# Patient Record
Sex: FEMALE | Race: WHITE | NOT HISPANIC OR LATINO | URBAN - METROPOLITAN AREA
[De-identification: names, ages, dates, MRNs, and addresses within clinical notes are randomized per-mention and may not be internally consistent; named-entity substitution may affect disease eponyms.]

---

## 2020-09-26 ENCOUNTER — INPATIENT (INPATIENT)
Facility: HOSPITAL | Age: 60
LOS: 0 days | Discharge: LEFT BEFORE DISCHARGE | End: 2020-09-26
Attending: STUDENT IN AN ORGANIZED HEALTH CARE EDUCATION/TRAINING PROGRAM | Admitting: STUDENT IN AN ORGANIZED HEALTH CARE EDUCATION/TRAINING PROGRAM
Payer: COMMERCIAL

## 2020-09-26 ENCOUNTER — TRANSCRIPTION ENCOUNTER (OUTPATIENT)
Age: 60
End: 2020-09-26

## 2020-09-26 VITALS
WEIGHT: 190.04 LBS | HEART RATE: 61 BPM | RESPIRATION RATE: 18 BRPM | OXYGEN SATURATION: 97 % | SYSTOLIC BLOOD PRESSURE: 193 MMHG | TEMPERATURE: 98 F | DIASTOLIC BLOOD PRESSURE: 92 MMHG

## 2020-09-26 VITALS
OXYGEN SATURATION: 99 % | SYSTOLIC BLOOD PRESSURE: 146 MMHG | RESPIRATION RATE: 18 BRPM | HEART RATE: 66 BPM | DIASTOLIC BLOOD PRESSURE: 63 MMHG | TEMPERATURE: 99 F

## 2020-09-26 DIAGNOSIS — Z98.890 OTHER SPECIFIED POSTPROCEDURAL STATES: Chronic | ICD-10-CM

## 2020-09-26 LAB
ALBUMIN SERPL ELPH-MCNC: 4.4 G/DL — SIGNIFICANT CHANGE UP (ref 3.5–5.2)
ALP SERPL-CCNC: 63 U/L — SIGNIFICANT CHANGE UP (ref 30–115)
ALT FLD-CCNC: 21 U/L — SIGNIFICANT CHANGE UP (ref 0–41)
ANION GAP SERPL CALC-SCNC: 12 MMOL/L — SIGNIFICANT CHANGE UP (ref 7–14)
APTT BLD: 27.8 SEC — SIGNIFICANT CHANGE UP (ref 27–39.2)
AST SERPL-CCNC: 23 U/L — SIGNIFICANT CHANGE UP (ref 0–41)
BASOPHILS # BLD AUTO: 0.04 K/UL — SIGNIFICANT CHANGE UP (ref 0–0.2)
BASOPHILS NFR BLD AUTO: 0.3 % — SIGNIFICANT CHANGE UP (ref 0–1)
BILIRUB SERPL-MCNC: 0.3 MG/DL — SIGNIFICANT CHANGE UP (ref 0.2–1.2)
BLD GP AB SCN SERPL QL: SIGNIFICANT CHANGE UP
BUN SERPL-MCNC: 19 MG/DL — SIGNIFICANT CHANGE UP (ref 10–20)
CALCIUM SERPL-MCNC: 8 MG/DL — LOW (ref 8.5–10.1)
CHLORIDE SERPL-SCNC: 97 MMOL/L — LOW (ref 98–110)
CO2 SERPL-SCNC: 23 MMOL/L — SIGNIFICANT CHANGE UP (ref 17–32)
CREAT SERPL-MCNC: 0.7 MG/DL — SIGNIFICANT CHANGE UP (ref 0.7–1.5)
EOSINOPHIL # BLD AUTO: 0.02 K/UL — SIGNIFICANT CHANGE UP (ref 0–0.7)
EOSINOPHIL NFR BLD AUTO: 0.2 % — SIGNIFICANT CHANGE UP (ref 0–8)
ETHANOL SERPL-MCNC: <10 MG/DL — SIGNIFICANT CHANGE UP
GLUCOSE SERPL-MCNC: 339 MG/DL — HIGH (ref 70–99)
HCT VFR BLD CALC: 42.4 % — SIGNIFICANT CHANGE UP (ref 37–47)
HGB BLD-MCNC: 14.1 G/DL — SIGNIFICANT CHANGE UP (ref 12–16)
IMM GRANULOCYTES NFR BLD AUTO: 0.5 % — HIGH (ref 0.1–0.3)
INR BLD: 0.95 RATIO — SIGNIFICANT CHANGE UP (ref 0.65–1.3)
LACTATE SERPL-SCNC: 1.4 MMOL/L — SIGNIFICANT CHANGE UP (ref 0.7–2)
LIDOCAIN IGE QN: 26 U/L — SIGNIFICANT CHANGE UP (ref 7–60)
LYMPHOCYTES # BLD AUTO: 1.82 K/UL — SIGNIFICANT CHANGE UP (ref 1.2–3.4)
LYMPHOCYTES # BLD AUTO: 14.5 % — LOW (ref 20.5–51.1)
MCHC RBC-ENTMCNC: 28.2 PG — SIGNIFICANT CHANGE UP (ref 27–31)
MCHC RBC-ENTMCNC: 33.3 G/DL — SIGNIFICANT CHANGE UP (ref 32–37)
MCV RBC AUTO: 84.8 FL — SIGNIFICANT CHANGE UP (ref 81–99)
MONOCYTES # BLD AUTO: 0.36 K/UL — SIGNIFICANT CHANGE UP (ref 0.1–0.6)
MONOCYTES NFR BLD AUTO: 2.9 % — SIGNIFICANT CHANGE UP (ref 1.7–9.3)
NEUTROPHILS # BLD AUTO: 10.22 K/UL — HIGH (ref 1.4–6.5)
NEUTROPHILS NFR BLD AUTO: 81.6 % — HIGH (ref 42.2–75.2)
NRBC # BLD: 0 /100 WBCS — SIGNIFICANT CHANGE UP (ref 0–0)
PLATELET # BLD AUTO: 350 K/UL — SIGNIFICANT CHANGE UP (ref 130–400)
POTASSIUM SERPL-MCNC: 4.5 MMOL/L — SIGNIFICANT CHANGE UP (ref 3.5–5)
POTASSIUM SERPL-SCNC: 4.5 MMOL/L — SIGNIFICANT CHANGE UP (ref 3.5–5)
PROT SERPL-MCNC: 7.4 G/DL — SIGNIFICANT CHANGE UP (ref 6–8)
PROTHROM AB SERPL-ACNC: 10.9 SEC — SIGNIFICANT CHANGE UP (ref 9.95–12.87)
RBC # BLD: 5 M/UL — SIGNIFICANT CHANGE UP (ref 4.2–5.4)
RBC # FLD: 13.2 % — SIGNIFICANT CHANGE UP (ref 11.5–14.5)
SODIUM SERPL-SCNC: 132 MMOL/L — LOW (ref 135–146)
WBC # BLD: 12.52 K/UL — HIGH (ref 4.8–10.8)
WBC # FLD AUTO: 12.52 K/UL — HIGH (ref 4.8–10.8)

## 2020-09-26 PROCEDURE — 71260 CT THORAX DX C+: CPT | Mod: 26

## 2020-09-26 PROCEDURE — 71046 X-RAY EXAM CHEST 2 VIEWS: CPT | Mod: 26

## 2020-09-26 PROCEDURE — 70486 CT MAXILLOFACIAL W/O DYE: CPT | Mod: 26

## 2020-09-26 PROCEDURE — 73090 X-RAY EXAM OF FOREARM: CPT | Mod: 26,RT

## 2020-09-26 PROCEDURE — 73110 X-RAY EXAM OF WRIST: CPT | Mod: 26,50

## 2020-09-26 PROCEDURE — 72125 CT NECK SPINE W/O DYE: CPT | Mod: 26

## 2020-09-26 PROCEDURE — 72170 X-RAY EXAM OF PELVIS: CPT | Mod: 26

## 2020-09-26 PROCEDURE — 99285 EMERGENCY DEPT VISIT HI MDM: CPT | Mod: 25

## 2020-09-26 PROCEDURE — 73130 X-RAY EXAM OF HAND: CPT | Mod: 26,50

## 2020-09-26 PROCEDURE — 74177 CT ABD & PELVIS W/CONTRAST: CPT | Mod: 26

## 2020-09-26 PROCEDURE — 99283 EMERGENCY DEPT VISIT LOW MDM: CPT

## 2020-09-26 PROCEDURE — 70450 CT HEAD/BRAIN W/O DYE: CPT | Mod: 26,77

## 2020-09-26 PROCEDURE — 70450 CT HEAD/BRAIN W/O DYE: CPT | Mod: 26

## 2020-09-26 PROCEDURE — 29125 APPL SHORT ARM SPLINT STATIC: CPT | Mod: 59

## 2020-09-26 PROCEDURE — 11730 AVULSION NAIL PLATE SIMPLE 1: CPT

## 2020-09-26 RX ORDER — DEXTROSE 50 % IN WATER 50 %
25 SYRINGE (ML) INTRAVENOUS ONCE
Refills: 0 | Status: DISCONTINUED | OUTPATIENT
Start: 2020-09-26 | End: 2020-09-26

## 2020-09-26 RX ORDER — ROSUVASTATIN CALCIUM 5 MG/1
1 TABLET ORAL
Qty: 0 | Refills: 0 | DISCHARGE

## 2020-09-26 RX ORDER — SODIUM CHLORIDE 9 MG/ML
1000 INJECTION, SOLUTION INTRAVENOUS
Refills: 0 | Status: DISCONTINUED | OUTPATIENT
Start: 2020-09-26 | End: 2020-09-26

## 2020-09-26 RX ORDER — INSULIN LISPRO 100/ML
VIAL (ML) SUBCUTANEOUS
Refills: 0 | Status: DISCONTINUED | OUTPATIENT
Start: 2020-09-26 | End: 2020-09-26

## 2020-09-26 RX ORDER — LEVOTHYROXINE SODIUM 125 MCG
1 TABLET ORAL
Qty: 0 | Refills: 0 | DISCHARGE

## 2020-09-26 RX ORDER — DEXTROSE 50 % IN WATER 50 %
12.5 SYRINGE (ML) INTRAVENOUS ONCE
Refills: 0 | Status: DISCONTINUED | OUTPATIENT
Start: 2020-09-26 | End: 2020-09-26

## 2020-09-26 RX ORDER — SIMVASTATIN 20 MG/1
10 TABLET, FILM COATED ORAL AT BEDTIME
Refills: 0 | Status: DISCONTINUED | OUTPATIENT
Start: 2020-09-26 | End: 2020-09-26

## 2020-09-26 RX ORDER — MORPHINE SULFATE 50 MG/1
4 CAPSULE, EXTENDED RELEASE ORAL ONCE
Refills: 0 | Status: DISCONTINUED | OUTPATIENT
Start: 2020-09-26 | End: 2020-09-26

## 2020-09-26 RX ORDER — LEVOTHYROXINE SODIUM 125 MCG
200 TABLET ORAL DAILY
Refills: 0 | Status: DISCONTINUED | OUTPATIENT
Start: 2020-09-26 | End: 2020-09-26

## 2020-09-26 RX ORDER — MORPHINE SULFATE 50 MG/1
2 CAPSULE, EXTENDED RELEASE ORAL ONCE
Refills: 0 | Status: DISCONTINUED | OUTPATIENT
Start: 2020-09-26 | End: 2020-09-26

## 2020-09-26 RX ORDER — DEXTROSE 50 % IN WATER 50 %
15 SYRINGE (ML) INTRAVENOUS ONCE
Refills: 0 | Status: DISCONTINUED | OUTPATIENT
Start: 2020-09-26 | End: 2020-09-26

## 2020-09-26 RX ORDER — INSULIN HUMAN 100 [IU]/ML
10 INJECTION, SOLUTION SUBCUTANEOUS
Qty: 0 | Refills: 0 | DISCHARGE

## 2020-09-26 RX ORDER — GLUCAGON INJECTION, SOLUTION 0.5 MG/.1ML
1 INJECTION, SOLUTION SUBCUTANEOUS ONCE
Refills: 0 | Status: DISCONTINUED | OUTPATIENT
Start: 2020-09-26 | End: 2020-09-26

## 2020-09-26 RX ORDER — DAPAGLIFLOZIN 10 MG/1
1 TABLET, FILM COATED ORAL
Qty: 0 | Refills: 0 | DISCHARGE

## 2020-09-26 RX ADMIN — MORPHINE SULFATE 4 MILLIGRAM(S): 50 CAPSULE, EXTENDED RELEASE ORAL at 09:03

## 2020-09-26 RX ADMIN — MORPHINE SULFATE 4 MILLIGRAM(S): 50 CAPSULE, EXTENDED RELEASE ORAL at 16:39

## 2020-09-26 RX ADMIN — Medication 1 TABLET(S): at 07:30

## 2020-09-26 RX ADMIN — MORPHINE SULFATE 4 MILLIGRAM(S): 50 CAPSULE, EXTENDED RELEASE ORAL at 09:15

## 2020-09-26 RX ADMIN — MORPHINE SULFATE 2 MILLIGRAM(S): 50 CAPSULE, EXTENDED RELEASE ORAL at 13:04

## 2020-09-26 NOTE — CONSULT NOTE ADULT - SUBJECTIVE AND OBJECTIVE BOX
HPI:  59 yo female hx of DM present c/o right sided facial injury/ b/l upper arm pain and left 2nd toe toenail avulsion s/p tripped and fell over a step at home about an hour ago. denies numbness/weakness to injured extremities. movement worsen the pain. ice and advil improved her pain. denies LOC. denies neck/back pain. denies headache /dizziness/nausea/vomiting/chest pain/sob/abd pain. last tetanus < 5 years    Pt seen and examined at bedside with Dr Crandall.  Pt sustained fall at home.  Remembers fall, no LOC.  Denies headache, dizziness or vision trouble.  AAOX3.      CT head: < from: CT Head No Cont (09.26.20 @ 06:34) >  IMPRESSION:  Acute, displaced fractures of the right zygomatic process and right maxilla. See separately dictated CT maxillofacial from the same day.  Hemorrhage within the right maxillary sinus.  Small amount of subarachnoid blood in the right sylvian fissure. No evidence of mass effect.  < end of copied text >      < from: CT Maxillofacial No Cont (09.26.20 @ 06:40) >  IMPRESSION:  1. Fractures of the right orbit as described above, involving the right infraorbital foramen, with multiple fractures of the right zygoma/maxilla at the zygomaxillary region, infratemporal surface of the right maxilla and right frontal zygomatic suture diastases. Associated blood in the right maxillary sinus with fracture fragments slightly protruding into the right maxillary sinus.  2. No retrobulbar hematoma.  3. Small focus of hemorrhage in the right medial temporal lobe. Unclear if this is within the subarachnoid space or could represent small parenchymal contusion.  4. Gas in the right infraorbital soft tissues, right temporomandibular joint and right infratemporal fossa, presumably posttraumatic.  5. Some sclerosis and deformityof the right lateral pterygoid plate. Unclear if this is acute or potentially remote trauma.  < end of copied text >            PAST MEDICAL & SURGICAL HISTORY:  Diabetes mellitus    No significant past surgical history        Home Medications:      Allergies    No Known Allergies    Intolerances        ROS:  [ x ] A ten-point review of systems is negative except as noted   [  ] Due to altered mental status/intubation, subjective information were not able to be obtained from the patient. History was obtained, to the extent possible, from review of the chart and collateral sources of information    MEDICATIONS  (STANDING):    MEDICATIONS  (PRN):      ICU Vital Signs Last 24 Hrs  T(C): 36.4 (26 Sep 2020 04:09), Max: 36.4 (26 Sep 2020 04:09)  T(F): 97.5 (26 Sep 2020 04:09), Max: 97.5 (26 Sep 2020 04:09)  HR: 61 (26 Sep 2020 04:09) (61 - 61)  BP: 193/92 (26 Sep 2020 04:09) (193/92 - 193/92)  BP(mean): --  ABP: --  ABP(mean): --  RR: 18 (26 Sep 2020 04:09) (18 - 18)  SpO2: 97% (26 Sep 2020 04:09) (97% - 97%)      I&O's Detail      CBC Full  -  ( 26 Sep 2020 09:04 )  WBC Count : 12.52 K/uL  RBC Count : 5.00 M/uL  Hemoglobin : 14.1 g/dL  Hematocrit : 42.4 %  Platelet Count - Automated : 350 K/uL  Mean Cell Volume : 84.8 fL  Mean Cell Hemoglobin : 28.2 pg  Mean Cell Hemoglobin Concentration : 33.3 g/dL  Auto Neutrophil # : 10.22 K/uL  Auto Lymphocyte # : 1.82 K/uL  Auto Monocyte # : 0.36 K/uL  Auto Eosinophil # : 0.02 K/uL  Auto Basophil # : 0.04 K/uL  Auto Neutrophil % : 81.6 %  Auto Lymphocyte % : 14.5 %  Auto Monocyte % : 2.9 %  Auto Eosinophil % : 0.2 %  Auto Basophil % : 0.3 %    09-26    132<L>  |  97<L>  |  19  ----------------------------<  339<H>  4.5   |  23  |  0.7    Ca    8.0<L>      26 Sep 2020 09:04    TPro  7.4  /  Alb  4.4  /  TBili  0.3  /  DBili  x   /  AST  23  /  ALT  21  /  AlkPhos  63  09-26            AAOX3. Verbal function intact  follows commands  tongue midline  no drift  PERRL  no drift  Pronator Drift: none  Motor: [email or web address]        Assessment/Plan:  No neurosurgical intervention  repeat CT head  if stable, may be discharged Neurosurgery wise  f/u Ortho c/s  f/u OMFS c/s  d/w attg

## 2020-09-26 NOTE — CONSULT NOTE ADULT - ASSESSMENT
Patient is a 59 y/o female with PMHx of DM, HLD, Hypothyroid, and back pain ( S/P L4 Discectomy at [place] with Dr Mayfield- 3 days prior this ED presentation ) whom presented to [facility] s/p fall a few hours prior to hospital presentation. She notes she has not been able to sleep for the last three days as she has been having lower back pain post discectomy. She was going down the stairs of her home to acquire medications when she tripped down the last three steps with both hands outstretched. She notes injury to B/L wrists and arms, as well as injuring her left foot ( left second toenail). She notes she struck the right side of her face, she did not have LOC. Currently she has the most pain over her right temporal area. She denies dizziness, change in vision/ hearing/ speech, emesis, nor severe headache. CT imaging maxillofacial notable for Fractures of the right orbit involving the right infraorbital foramen, with multiple fractures of the right zygoma/maxilla at the zygomaxillary region, infratemporal surface of the right maxilla and right frontal zygomatic suture diastases. Associated blood was also seen in the right maxillary sinus with fracture fragments slightly protruding into the right maxillary sinus. CT of the head Small amount of subarachnoid blood in the right sylvian fissure. Patient currently stable but given degree of trauma with recent L4 intervention would pursue Pan scan and spinal imaging as well. Would obtain consult from OMFS as well as Neurosurgery given extent of facial injuries with sinus involvement as well as small noted subarachnoid blood.     S/P Fall down three steps/ Orbital Fracture/Small hemorrhage of right temporal lobe/  Right- Distal intra-articular radial styloid fracture/ Left-Comminuted intra-articular fracture through the thumb metacarpal base with fragment displacement  - Full CT scan to image the spine given extent of trauma and recent surgery  - OMFS and Neurosurgery consults  - May benefit from keppra given injury  - Serial Neuro checks  - Currently hemodynamically stable   - ETOH level less than 10  - Will follow      Patient is a 61 y/o female with PMHx of DM, HLD, Hypothyroid, and back pain ( S/P L4 Discectomy at [place] with Dr Mayfield- 3 days prior this ED presentation ) whom presented to [facility] s/p fall a few hours prior to hospital presentation. She notes she has not been able to sleep for the last three days as she has been having lower back pain post discectomy. She was going down the stairs of her home to acquire medications when she tripped down the last three steps with both hands outstretched. She notes injury to B/L wrists and arms, as well as injuring her left foot ( left second toenail). She notes she struck the right side of her face, she did not have LOC. Currently she has the most pain over her right temporal area. She denies dizziness, change in vision/ hearing/ speech, emesis, nor severe headache. CT imaging maxillofacial notable for Fractures of the right orbit involving the right infraorbital foramen, with multiple fractures of the right zygoma/maxilla at the zygomaxillary region, infratemporal surface of the right maxilla and right frontal zygomatic suture diastases. Associated blood was also seen in the right maxillary sinus with fracture fragments slightly protruding into the right maxillary sinus. CT of the head Small amount of subarachnoid blood in the right sylvian fissure. Patient currently stable but given degree of trauma with recent L4 intervention would pursue Pan scan and spinal imaging as well. Would obtain consult from OMFS as well as Neurosurgery given extent of facial injuries with sinus involvement as well as small noted subarachnoid blood.     S/P Fall down three steps/ Orbital Fracture/Small hemorrhage of right temporal lobe/  Right- Distal intra-articular radial styloid fracture/ Left-Comminuted intra-articular fracture through the thumb metacarpal base with fragment displacement  - Full CT scan to image the spine given extent of trauma and recent surgery  - OMFS and Neurosurgery consults  - May benefit from keppra given injury  - Serial Neuro checks  - Currently hemodynamically stable   - ETOH level less than 10  - Will follow     Senior Resident Addendum  Please see H&P for further details.

## 2020-09-26 NOTE — CONSULT NOTE ADULT - SUBJECTIVE AND OBJECTIVE BOX
Patient is a 60y old  Female who presents with a chief complaint of pain after mechanical fall.    HPI: Patient stated that she fell down 3 stairs in her home early this morning. She stated that she recently had back surgery in early August and has been experiencing pain in her legs for the past 3 nights which has kept her awake. She stated that she was exhausted when she woke up and tripped over the stairs.      PAST MEDICAL & SURGICAL HISTORY:  Diabetes mellitus    Patient stated that she had back surgery in August.      (  - ) heart valve replacement  (  - ) joint replacement  (  - ) pregnancy    MEDICATIONS  (STANDING):  insulin  synthroid    MEDICATIONS  (PRN): denies    No Known Allergies    *SOCIAL HISTORY: ( -  ) Tobacco; (  - ) ETOH    Vital Signs Last 24 Hrs  T(C): 36.4 (26 Sep 2020 04:09), Max: 36.4 (26 Sep 2020 04:09)  T(F): 97.5 (26 Sep 2020 04:09), Max: 97.5 (26 Sep 2020 04:09)  HR: 61 (26 Sep 2020 04:09) (61 - 61)  BP: 193/92 (26 Sep 2020 04:09) (193/92 - 193/92)  BP(mean): --  RR: 18 (26 Sep 2020 04:09) (18 - 18)  SpO2: 97% (26 Sep 2020 04:09) (97% - 97%)    LABS:                        14.1   12.52 )-----------( 350      ( 26 Sep 2020 09:04 )             42.4     09-26    132<L>  |  97<L>  |  19  ----------------------------<  339<H>  4.5   |  23  |  0.7    Ca    8.0<L>      26 Sep 2020 09:04    TPro  7.4  /  Alb  4.4  /  TBili  0.3  /  DBili  x   /  AST  23  /  ALT  21  /  AlkPhos  63  09-26    WBC Count: 12.52 K/uL <H> [4.80 - 10.80] (09-26 @ 09:04)  Platelet Count - Automated: 350 K/uL [130 - 400] (09-26 @ 09:04)  INR: 0.95 ratio [0.65 - 1.30] (09-26 @ 09:04)      PT/INR - ( 26 Sep 2020 09:04 )   PT: 10.90 sec;   INR: 0.95 ratio         PTT - ( 26 Sep 2020 09:04 )  PTT:27.8 sec    Last Dental Visit: unknown    EOE:  TMJ ( -  ) clicks                     (  - ) pops                     (  + ) crepitus             Mandible <<FROM>>             Facial bones and MOM   Radiology maxillofacial CT report findings:  <<Slightly displaced fracture of right zygomatic arch. Fracture of right lateral orbital wall with displaced fracture fragment slightly protruding into the right extracoronal lateral extracoronal space, and associated diastases of the right frontozygomatic suture. Fracture involves the infratemporal surface of the right maxilla and the right zygomaticomaxillary region. There are fracture of the right inferior orbital wall which involve the right infraorbital foramen. There is slight herniation of intraorbital fat. The extraocular muscles are intact. There are frothy secretions in the right maxillary sinus with high density material, presumably blood.   There is fracture of the inferolateral and anterior maxillary sinus walls with displacement, noting a small osseous fragment protruding into the right maxillary sinus. There is deformity of the right lateral pterygoid with adjacent sclerosis.   The left pterygoid process is intact.  There is a small metallic foreign body in the right maxillary sinus, near the floor of the right maxillary sinus, adjacent to the inferolateral maxillary sinus wall fracture. This is of unclear etiology.  Left orbit is unremarkable.   There is a small amount of hemorrhage in the medial right temporal lobe. Unclear is this is subarachnoid or within the right temporal lobe.  There is gas in the right temporomandibular joint and right infratemporal fossa there is partial opacification of pneumatized right temporal bone air cells. The right middle ear cavity is normally aerated.  There is temporal, malar, and periorbital soft tissue swelling. There is gas in the right infraorbital soft tissues.  The mandible is intact, noting anchors for dental implants. The maxilla is edentulous. The nasal bones are intact. The front processes of the maxilla are intact.>>             ( +  ) trismus <<Patient able to open 2 finger breadths>>             ( -  ) lymphadenopathy             ( +  ) swelling             (  + ) asymmetry             ( +  ) palpation             ( -  ) dyspnea             (  - ) dysphagia             (  - ) loss of consciousness    IOE:  edentulous, maxillary and mandibular dentures                 hard/soft palate:  ( -  ) palatal torus, <<No pathology noted>>            tongue/FOM <<No pathology noted>>            labial/buccal mucosa <<No pathology noted>>           ( -  ) percussion           (  - ) palpation           (  - ) swelling            ( - ) abscess           (  - ) sinus tract    *DENTAL RADIOGRAPHS: none    RADIOLOGY & ADDITIONAL STUDIES:   See maxillofacial CT report.    *ASSESSMENT:   Patient presented to ED with .  Patient alert, awake, oriented to place/day/time.  No scalp lacerations.   Normal range of motion in neck.   No battles sign. Normal hearing in both ears.  Evidence of previous epistaxis in right nostril. No current epistaxis. No nasal deviation. No septal hematoma. No rhinorrhea.  Pupils equal, round, reactive to light. No diplopia. Cranial nerve exam normal. No sign of entrapment. No subconjunctival hemorrhage.  Patient able to open 2 finger breadths. Able to move mandible to the left, difficulty moving it to the right and stated that she hears crackling sounds when mandible is in functional movement.   No intraoral swelling or bruising noted.  Slight swelling of right infraorbital and zygomatic regions.  Slight sensitivity to palpation of right infraorbital region.    *PLAN:    No acute surgical intervention at this time per oral surgery. Continue care per ED for other injuries. Sinus precautions 2 weeks avoid sneezing or nose blowing. Liquid/soft diet x 2 weeks. Cold compress to right face. Consider ENT evaluation for foreign body seen on CT scan in maxillary sinus. Baseline ophthalmology evaluation post orbital trauma. Patient can follow up outpatient with dental/[facility] clinic 1:00pm wednesday 9/30/2020 or with private dentist if prefers.      PROCEDURE:   Verbal and written consent given.  Anesthesia: none  Treatment: head and neck exam    RECOMMENDATIONS:  1) Continue care per ED for other injuries.  2) augmentin 875-125mg q12 x 7 days.  3) Sinus precautions 2 weeks avoid sneezing or nose blowing.  4) liquid/soft diet x 2 weeks.  5) cold compress to right face.  6) consider ENT evaluation for foreign body seen on CT scan in maxillary sinus.  7) Baseline ophthalmology evaluation post orbital trauma.  8) Patient can follow up outpatient with dental/[facility] clinic 1:00pm wednesday 9/30/2020 or with private dentist if prefers.  9) Dental F/U with outpatient dentist for comprehensive dental care.   10) If any difficulty swallowing/breathing, fever occur, return to ER.     Resident Name, pager # Patient is a 60y old  Female who presents with a chief complaint of pain after mechanical fall.    HPI: Patient stated that she fell down 3 stairs in her home early this morning. She stated that she recently had back surgery in early August and has been experiencing pain in her legs for the past 3 nights which has kept her awake. She stated that she was exhausted when she woke up and tripped over the stairs.      PAST MEDICAL & SURGICAL HISTORY:  Diabetes mellitus    Patient stated that she had back surgery in August.      (  - ) heart valve replacement  (  - ) joint replacement  (  - ) pregnancy    MEDICATIONS  (STANDING):  insulin  synthroid    MEDICATIONS  (PRN): denies    No Known Allergies    *SOCIAL HISTORY: ( -  ) Tobacco; (  - ) ETOH    Vital Signs Last 24 Hrs  T(C): 36.4 (26 Sep 2020 04:09), Max: 36.4 (26 Sep 2020 04:09)  T(F): 97.5 (26 Sep 2020 04:09), Max: 97.5 (26 Sep 2020 04:09)  HR: 61 (26 Sep 2020 04:09) (61 - 61)  BP: 193/92 (26 Sep 2020 04:09) (193/92 - 193/92)  BP(mean): --  RR: 18 (26 Sep 2020 04:09) (18 - 18)  SpO2: 97% (26 Sep 2020 04:09) (97% - 97%)    LABS:                        14.1   12.52 )-----------( 350      ( 26 Sep 2020 09:04 )             42.4     09-26    132<L>  |  97<L>  |  19  ----------------------------<  339<H>  4.5   |  23  |  0.7    Ca    8.0<L>      26 Sep 2020 09:04    TPro  7.4  /  Alb  4.4  /  TBili  0.3  /  DBili  x   /  AST  23  /  ALT  21  /  AlkPhos  63  09-26    WBC Count: 12.52 K/uL <H> [4.80 - 10.80] (09-26 @ 09:04)  Platelet Count - Automated: 350 K/uL [130 - 400] (09-26 @ 09:04)  INR: 0.95 ratio [0.65 - 1.30] (09-26 @ 09:04)      PT/INR - ( 26 Sep 2020 09:04 )   PT: 10.90 sec;   INR: 0.95 ratio         PTT - ( 26 Sep 2020 09:04 )  PTT:27.8 sec    Last Dental Visit: unknown    EOE:  TMJ ( -  ) clicks                     (  - ) pops                     (  + ) crepitus             Mandible <<FROM>>             Facial bones and MOM   Radiology maxillofacial CT report findings:  <<Slightly displaced fracture of right zygomatic arch. Fracture of right lateral orbital wall with displaced fracture fragment slightly protruding into the right extracoronal lateral extracoronal space, and associated diastases of the right frontozygomatic suture. Fracture involves the infratemporal surface of the right maxilla and the right zygomaticomaxillary region. There are fracture of the right inferior orbital wall which involve the right infraorbital foramen. There is slight herniation of intraorbital fat. The extraocular muscles are intact. There are frothy secretions in the right maxillary sinus with high density material, presumably blood.   There is fracture of the inferolateral and anterior maxillary sinus walls with displacement, noting a small osseous fragment protruding into the right maxillary sinus. There is deformity of the right lateral pterygoid with adjacent sclerosis.   The left pterygoid process is intact.  There is a small metallic foreign body in the right maxillary sinus, near the floor of the right maxillary sinus, adjacent to the inferolateral maxillary sinus wall fracture. This is of unclear etiology.  Left orbit is unremarkable.   There is a small amount of hemorrhage in the medial right temporal lobe. Unclear is this is subarachnoid or within the right temporal lobe.  There is gas in the right temporomandibular joint and right infratemporal fossa there is partial opacification of pneumatized right temporal bone air cells. The right middle ear cavity is normally aerated.  There is temporal, malar, and periorbital soft tissue swelling. There is gas in the right infraorbital soft tissues.  The mandible is intact, noting anchors for dental implants. The maxilla is edentulous. The nasal bones are intact. The front processes of the maxilla are intact.>>             ( -  ) trismus <<Patient able to open 2 finger breadths. Limited opening associated with right side pain.>>             ( -  ) lymphadenopathy             ( +  ) swelling             (  + ) asymmetry             ( +  ) palpation             ( -  ) dyspnea             (  - ) dysphagia             (  - ) loss of consciousness    IOE:  edentulous, maxillary and mandibular dentures                 hard/soft palate:  ( -  ) palatal torus, <<No pathology noted>>            tongue/FOM <<No pathology noted>>            labial/buccal mucosa <<No pathology noted>>           ( -  ) percussion           (  - ) palpation           (  - ) swelling            ( - ) abscess           (  - ) sinus tract    *DENTAL RADIOGRAPHS: none    RADIOLOGY & ADDITIONAL STUDIES:   See maxillofacial CT report.    *ASSESSMENT:   Patient presented to ED with .  Patient alert, awake, oriented to place/day/time.  No scalp lacerations.   Normal range of motion in neck.   No battles sign. Normal hearing in both ears.  Evidence of previous epistaxis in right nostril. No current epistaxis. No nasal deviation. No septal hematoma. No rhinorrhea.  Pupils equal, round, reactive to light. No diplopia. Cranial nerve exam normal. No sign of entrapment. No subconjunctival hemorrhage.  Patient able to open 2 finger breadths. Limited opening associated with pain on right side. Patient states that she hears crackling sounds when opening.   No intraoral swelling or bruising noted.  Slight swelling of right infraorbital and zygomatic regions.  Slight sensitivity to palpation of right infraorbital region.    *PLAN:    No acute surgical intervention at this time per oral surgery. Continue care per ED for other injuries. Sinus precautions 2 weeks avoid sneezing or nose blowing. Liquid/soft diet x 2 weeks. Cold compress to right face. Consider ENT evaluation for foreign body seen on CT scan in maxillary sinus. Baseline ophthalmology evaluation post orbital trauma. Patient can follow up outpatient with dental/[facility] clinic 1:00pm wednesday 9/30/2020 or with private dentist if prefers. Discussed plan with patient. Patient interested in following up with private [facility].      PROCEDURE:   Verbal and written consent given.  Anesthesia: none  Treatment: head and neck exam    RECOMMENDATIONS:  1) Continue care per ED for other injuries.  2) augmentin 875-125mg q12 x 7 days.  3) Sinus precautions 2 weeks avoid sneezing or nose blowing.  4) liquid/soft diet x 2 weeks.  5) cold compress to right face.  6) consider ENT evaluation for foreign body seen on CT scan in maxillary sinus.  7) Baseline ophthalmology evaluation post orbital trauma.  8) Patient can follow up outpatient with dental/[facility] clinic 1:00pm wednesday 9/30/2020 or with private dentist if prefers.  9) Dental F/U with outpatient dentist for comprehensive dental care.   10) If any difficulty swallowing/breathing, fever occur, return to ER.     Resident Name, pager # Patient is a 60y old  Female who presents with a chief complaint of pain after mechanical fall.    HPI: Patient stated that she fell down 3 stairs in her home early this morning. She stated that she recently had back surgery in early August and has been experiencing pain in her legs for the past 3 nights which has kept her awake. She stated that she was exhausted when she woke up and tripped over the stairs.      PAST MEDICAL & SURGICAL HISTORY:  Diabetes mellitus    Patient stated that she had back surgery in August.      (  - ) heart valve replacement  (  - ) joint replacement  (  - ) pregnancy    MEDICATIONS  (STANDING):  insulin  synthroid    MEDICATIONS  (PRN): denies    No Known Allergies    *SOCIAL HISTORY: ( -  ) Tobacco; (  - ) ETOH    Vital Signs Last 24 Hrs  T(C): 36.4 (26 Sep 2020 04:09), Max: 36.4 (26 Sep 2020 04:09)  T(F): 97.5 (26 Sep 2020 04:09), Max: 97.5 (26 Sep 2020 04:09)  HR: 61 (26 Sep 2020 04:09) (61 - 61)  BP: 193/92 (26 Sep 2020 04:09) (193/92 - 193/92)  BP(mean): --  RR: 18 (26 Sep 2020 04:09) (18 - 18)  SpO2: 97% (26 Sep 2020 04:09) (97% - 97%)    LABS:                        14.1   12.52 )-----------( 350      ( 26 Sep 2020 09:04 )             42.4     09-26    132<L>  |  97<L>  |  19  ----------------------------<  339<H>  4.5   |  23  |  0.7    Ca    8.0<L>      26 Sep 2020 09:04    TPro  7.4  /  Alb  4.4  /  TBili  0.3  /  DBili  x   /  AST  23  /  ALT  21  /  AlkPhos  63  09-26    WBC Count: 12.52 K/uL <H> [4.80 - 10.80] (09-26 @ 09:04)  Platelet Count - Automated: 350 K/uL [130 - 400] (09-26 @ 09:04)  INR: 0.95 ratio [0.65 - 1.30] (09-26 @ 09:04)      PT/INR - ( 26 Sep 2020 09:04 )   PT: 10.90 sec;   INR: 0.95 ratio         PTT - ( 26 Sep 2020 09:04 )  PTT:27.8 sec    Last Dental Visit: unknown    EOE:  TMJ ( -  ) clicks                     (  - ) pops                     (  + ) crepitus             Mandible <<FROM>>             Facial bones and MOM   Radiology maxillofacial CT report findings:  <<Slightly displaced fracture of right zygomatic arch. Fracture of right lateral orbital wall with displaced fracture fragment slightly protruding into the right extracoronal lateral extracoronal space, and associated diastases of the right frontozygomatic suture. Fracture involves the infratemporal surface of the right maxilla and the right zygomaticomaxillary region. There are fracture of the right inferior orbital wall which involve the right infraorbital foramen. There is slight herniation of intraorbital fat. The extraocular muscles are intact. There are frothy secretions in the right maxillary sinus with high density material, presumably blood.   There is fracture of the inferolateral and anterior maxillary sinus walls with displacement, noting a small osseous fragment protruding into the right maxillary sinus. There is deformity of the right lateral pterygoid with adjacent sclerosis.   The left pterygoid process is intact.  There is a small metallic foreign body in the right maxillary sinus, near the floor of the right maxillary sinus, adjacent to the inferolateral maxillary sinus wall fracture. This is of unclear etiology.  Left orbit is unremarkable.   There is a small amount of hemorrhage in the medial right temporal lobe. Unclear is this is subarachnoid or within the right temporal lobe.  There is gas in the right temporomandibular joint and right infratemporal fossa there is partial opacification of pneumatized right temporal bone air cells. The right middle ear cavity is normally aerated.  There is temporal, malar, and periorbital soft tissue swelling. There is gas in the right infraorbital soft tissues.  The mandible is intact, noting anchors for dental implants. The maxilla is edentulous. The nasal bones are intact. The front processes of the maxilla are intact.>>             ( -  ) trismus <<Patient able to open 2 finger breadths. Limited opening associated with right side pain.>>             ( -  ) lymphadenopathy             ( +  ) swelling             (  + ) asymmetry             ( +  ) palpation             ( -  ) dyspnea             (  - ) dysphagia             (  - ) loss of consciousness    IOE:  edentulous, maxillary and mandibular dentures                 hard/soft palate:  ( -  ) palatal torus, <<No pathology noted>>            tongue/FOM <<No pathology noted>>            labial/buccal mucosa <<No pathology noted>>           ( -  ) percussion           (  - ) palpation           (  - ) swelling            ( - ) abscess           (  - ) sinus tract    *DENTAL RADIOGRAPHS: none    RADIOLOGY & ADDITIONAL STUDIES:   See maxillofacial CT report.    *ASSESSMENT:   Patient presented to ED with .  Patient alert, awake, oriented to place/day/time.  No scalp lacerations.   Normal range of motion in neck.   No battles sign. Normal hearing in both ears.  Evidence of previous epistaxis in right nostril. No current epistaxis. No nasal deviation. No septal hematoma. No rhinorrhea.  Pupils equal, round, reactive to light. No diplopia. Cranial nerve exam normal. No sign of entrapment. No subconjunctival hemorrhage.  Patient able to open 2 finger breadths. Limited opening associated with pain on right side. Patient states that she hears crackling sounds when opening.   No intraoral swelling or bruising noted.  Slight swelling of right infraorbital and zygomatic regions.  Slight sensitivity to palpation of right infraorbital region.    *PLAN:    No acute surgical intervention at this time per oral surgery. Continue care per ED for other injuries. Sinus precautions 2 weeks avoid sneezing or nose blowing. Liquid/soft diet x 2 weeks. Cold compress to right face. Consider ENT evaluation for foreign body seen on CT scan in maxillary sinus. Baseline ophthalmology evaluation post orbital trauma. Patient can follow up outpatient with [facility] located at [address] [place] [address] Wednesday morning at 9:00AM or with her private [facility] if she prefers. Discussed plan with patient. Patient interested in following up with private OMFS Dr. Lonnie Jiang.      PROCEDURE:   Verbal and written consent given.  Anesthesia: none  Treatment: head and neck exam    RECOMMENDATIONS:  1) Continue care per ED for other injuries.  2) augmentin 875-125mg q12 x 7 days.  3) Sinus precautions 2 weeks avoid sneezing or nose blowing.  4) liquid/soft diet x 2 weeks.  5) cold compress to right face.  6) consider ENT evaluation for foreign body seen on CT scan in maxillary sinus.  7) Baseline ophthalmology evaluation post orbital trauma.  8) Patient can follow up outpatient with [facility] located at [address] [place] [address] Wednesday morning at 9:00AM or with her private [facility] if she prefers.  9) Dental F/U with outpatient dentist for comprehensive dental care.   10) If any difficulty swallowing/breathing, fever occur, return to ER.     Resident Name, pager # Patient is a 60y old  Female who presents with a chief complaint of pain after mechanical fall.    HPI: Patient stated that she fell down 3 stairs in her home early this morning. She stated that she recently had back surgery in early August and has been experiencing pain in her legs for the past 3 nights which has kept her awake. She stated that she was exhausted when she woke up and tripped over the stairs.      PAST MEDICAL & SURGICAL HISTORY:  Diabetes mellitus    Patient stated that she had back surgery in August.      (  - ) heart valve replacement  (  - ) joint replacement  (  - ) pregnancy    MEDICATIONS  (STANDING):  insulin  synthroid    MEDICATIONS  (PRN): denies    No Known Allergies    *SOCIAL HISTORY: ( -  ) Tobacco; (  - ) ETOH    Vital Signs Last 24 Hrs  T(C): 36.4 (26 Sep 2020 04:09), Max: 36.4 (26 Sep 2020 04:09)  T(F): 97.5 (26 Sep 2020 04:09), Max: 97.5 (26 Sep 2020 04:09)  HR: 61 (26 Sep 2020 04:09) (61 - 61)  BP: 193/92 (26 Sep 2020 04:09) (193/92 - 193/92)  BP(mean): --  RR: 18 (26 Sep 2020 04:09) (18 - 18)  SpO2: 97% (26 Sep 2020 04:09) (97% - 97%)    LABS:                        14.1   12.52 )-----------( 350      ( 26 Sep 2020 09:04 )             42.4     09-26    132<L>  |  97<L>  |  19  ----------------------------<  339<H>  4.5   |  23  |  0.7    Ca    8.0<L>      26 Sep 2020 09:04    TPro  7.4  /  Alb  4.4  /  TBili  0.3  /  DBili  x   /  AST  23  /  ALT  21  /  AlkPhos  63  09-26    WBC Count: 12.52 K/uL <H> [4.80 - 10.80] (09-26 @ 09:04)  Platelet Count - Automated: 350 K/uL [130 - 400] (09-26 @ 09:04)  INR: 0.95 ratio [0.65 - 1.30] (09-26 @ 09:04)      PT/INR - ( 26 Sep 2020 09:04 )   PT: 10.90 sec;   INR: 0.95 ratio         PTT - ( 26 Sep 2020 09:04 )  PTT:27.8 sec    Last Dental Visit: unknown    EOE:  TMJ ( -  ) clicks                     (  - ) pops                     (  + ) crepitus             Mandible <<FROM>>             Facial bones and MOM   Radiology maxillofacial CT report findings:  <<Slightly displaced fracture of right zygomatic arch. Fracture of right lateral orbital wall with displaced fracture fragment slightly protruding into the right extracoronal lateral extracoronal space, and associated diastases of the right frontozygomatic suture. Fracture involves the infratemporal surface of the right maxilla and the right zygomaticomaxillary region. There are fracture of the right inferior orbital wall which involve the right infraorbital foramen. There is slight herniation of intraorbital fat. The extraocular muscles are intact. There are frothy secretions in the right maxillary sinus with high density material, presumably blood.   There is fracture of the inferolateral and anterior maxillary sinus walls with displacement, noting a small osseous fragment protruding into the right maxillary sinus. There is deformity of the right lateral pterygoid with adjacent sclerosis.   The left pterygoid process is intact.  There is a small metallic foreign body in the right maxillary sinus, near the floor of the right maxillary sinus, adjacent to the inferolateral maxillary sinus wall fracture. This is of unclear etiology.  Left orbit is unremarkable.   There is a small amount of hemorrhage in the medial right temporal lobe. Unclear is this is subarachnoid or within the right temporal lobe.  There is gas in the right temporomandibular joint and right infratemporal fossa there is partial opacification of pneumatized right temporal bone air cells. The right middle ear cavity is normally aerated.  There is temporal, malar, and periorbital soft tissue swelling. There is gas in the right infraorbital soft tissues.  The mandible is intact, noting anchors for dental implants. The maxilla is edentulous. The nasal bones are intact. The front processes of the maxilla are intact.>>             ( -  ) trismus <<Patient able to open 2 finger breadths. Limited opening associated with right side pain.>>             ( -  ) lymphadenopathy             ( +  ) swelling             (  + ) asymmetry             ( +  ) palpation             ( -  ) dyspnea             (  - ) dysphagia             (  - ) loss of consciousness    IOE:  edentulous, maxillary and mandibular dentures                 hard/soft palate:  ( -  ) palatal torus, <<No pathology noted>>            tongue/FOM <<No pathology noted>>            labial/buccal mucosa <<No pathology noted>>           ( -  ) percussion           (  - ) palpation           (  - ) swelling            ( - ) abscess           (  - ) sinus tract    *DENTAL RADIOGRAPHS: none    RADIOLOGY & ADDITIONAL STUDIES:   See maxillofacial CT report.    *ASSESSMENT:   Patient presented to ED with .  Patient alert, awake, oriented to place/day/time.  No scalp lacerations.   Normal range of motion in neck.   No battles sign. Normal hearing in both ears.  Evidence of previous epistaxis in right nostril. No current epistaxis. No nasal deviation. No septal hematoma. No rhinorrhea.  Pupils equal, round, reactive to light. No diplopia. Cranial nerve exam normal. No sign of entrapment. No subconjunctival hemorrhage.  Patient able to open 2 finger breadths. Limited opening associated with pain on right side. Patient states that she hears crackling sounds when opening.   No intraoral swelling or bruising noted.  Slight swelling of right infraorbital and zygomatic regions.  Slight sensitivity to palpation of right infraorbital region.    *PLAN:    No acute surgical intervention at this time per oral surgery. Continue care per ED for other injuries. Sinus precautions 2 weeks avoid sneezing or nose blowing. Liquid/soft diet x 2 weeks. Cold compress to right face. Consider ENT evaluation for foreign body seen on CT scan in maxillary sinus. Baseline ophthalmology evaluation post orbital trauma. Patient can follow up outpatient with OMFS located at [address] [place] [address] Wednesday morning at 9:00AM or with her private OMFS if she prefers. Discussed plan with patient. Patient interested in following up with private OMFS Dr. Lonnie Jiang.      RECOMMENDATIONS:  1) Continue care per ED for other injuries.  2) augmentin 875-125mg q12 x 7 days.  3) Sinus precautions 2 weeks avoid sneezing or nose blowing.  4) liquid/soft diet x 2 weeks.  5) cold compress to right face.  6) consider ENT evaluation for foreign body seen on CT scan in maxillary sinus.  7) Baseline ophthalmology evaluation post orbital trauma.  8) Patient can follow up outpatient with [facility] located at [address] [facility] Wednesday morning at 9:00AM or with her private OMFS if she prefers.  9) Dental F/U with outpatient dentist for comprehensive dental care.   10) If any difficulty swallowing/breathing, fever occur, return to ER.     Resident Name, pager #

## 2020-09-26 NOTE — H&P ADULT - NSHPSOCIALHISTORY_GEN_ALL_CORE
Social History  Denies Current Tobacco use  Denies Current ETOH use  Denies Current Illicit Drug use

## 2020-09-26 NOTE — H&P ADULT - NSHPLABSRESULTS_GEN_ALL_CORE
I have personally evaluated and examined the patient. The Attending was available to me as a supervising provider if needed.
14.1   12.52 )-----------( 350      ( 26 Sep 2020 09:04 )             42.4     09-26    132<L>  |  97<L>  |  19  ----------------------------<  339<H>  4.5   |  23  |  0.7    Ca    8.0<L>      26 Sep 2020 09:04    TPro  7.4  /  Alb  4.4  /  TBili  0.3  /  DBili  x   /  AST  23  /  ALT  21  /  AlkPhos  63  09-26    INR 0.98      RADIOLOGY & ADDITIONAL STUDIES:  Xray Pelvis AP only 09.26.20   Findings/  impression: No acutely displaced fracture. Bilateral moderate hip mild pubic symphyseal and sacroiliac joint degenerative changes.    CT Maxillofacial No Cont 09.26.20   IMPRESSION:    1. Fractures of the right orbit as described above, involving the right infraorbital foramen, with multiple fractures of the right zygoma/maxilla at the zygomaxillary region, infratemporal surface of the right maxilla and right frontal zygomatic suture diastases. Associated blood in the right maxillary sinus with fracture fragments slightly protruding into the right maxillary sinus.    2. No retrobulbar hematoma.    3. Small focus of hemorrhage in the right medial temporal lobe. Unclear if this is within the subarachnoid space or could represent small parenchymal contusion.    4. Gas in the right infraorbital soft tissues, right temporomandibular joint and right infratemporal fossa, presumably posttraumatic.    5. Some sclerosis and deformity of the right lateral pterygoid plate. Unclear if this is acute or potentially remote trauma.      CT Head No Cont 09.26.20   IMPRESSION:  Acute, displaced fractures of the right zygomatic process and right maxilla. See separately dictated CT maxillofacial from the same day.    Hemorrhage within the right maxillary sinus.    Small amount of subarachnoid blood in the right sylvian fissure. No evidence of mass effect.      Xray Forearm, Right 09.26.20   Findings/  impression: Partially imaged distal intra-articular radial styloid fracture. No other fractures identified. Elbow degenerative change without elbow effusion or malalignment.      Xray Wrist 3 Views, Bilateral 9.26.20   impression:    Right wrist and hand: Bones are osteopenic. Distal radial intra-articular styloid fracture with minimal dorsal displacement. Ulnar variance is neutral. Radiocarpal and ulnocarpal degenerative changes are present.    Left hand and wrist: Osteopenic. Comminuted intra-articular fracture through the thumb metacarpal base with fragment displacement. Radiocarpal and intercarpal degenerative changes present.      CT Abdomen and Pelvis w/ IV Cont 09.26.20     IMPRESSION:    No evidence of an acute traumatic solid organ or osseous injury.    Cholelithiasis.    Evidence of coronary arterial disease.      CT Chest w/ IV Cont 09.26.20     IMPRESSION:    No evidence of an acute traumatic solid organ or osseous injury.    Cholelithiasis.    Evidence of coronary arterial disease.

## 2020-09-26 NOTE — H&P ADULT - ASSESSMENT
Patient is a 61 y/o female with PMHx of DM, HLD, Hypothyroid, and back pain ( S/P L4 Discectomy at [place] with Dr Mafyield- 3 days prior this ED presentation ) whom presented to [facility] s/p fall a few hours prior to hospital presentation. She notes she has not been able to sleep for the last three days as she has been having lower back pain post discectomy. She was going down the stairs of her home to acquire medications when she tripped down the last three steps with both hands outstretched. She notes injury to B/L wrists and arms, as well as injuring her left foot ( left second toenail). She notes she struck the right side of her face, she did not have LOC. Currently she has the most pain over her right temporal area. She denies dizziness, change in vision/ hearing/ speech, emesis, nor severe headache. CT imaging maxillofacial notable for Fractures of the right orbit involving the right infraorbital foramen, with multiple fractures of the right zygoma/maxilla at the zygomaxillary region, infratemporal surface of the right maxilla and right frontal zygomatic suture diastases. Associated blood was also seen in the right maxillary sinus with fracture fragments slightly protruding into the right maxillary sinus. CT of the head Small amount of subarachnoid blood in the right sylvian fissure. Patient currently stable seen by Neurosurgery as well as OMFS. Patient to be admitted to Trauma team .    S/P Fall down three steps/ Orbital Fracture/Small hemorrhage of right temporal lobe/  Right- Distal intra-articular radial styloid fracture/ Left-Comminuted intra-articular fracture through the thumb metacarpal base with fragment displacement  - Additional CT scans of chest, abdomen, and C-spine unrevealing   - OMFS and Neurosurgery consults appreciated  - Currently hemodynamically stable   - ETOH level less than 10  - Orthopedic consult   - NPO for now  - ISS for glucose coverage  - LR hydration at 75ml/hr   Patient is a 61 y/o female with PMHx of DM, HLD, Hypothyroid, and back pain ( S/P L4 Discectomy at [place] with Dr Mayfield- 3 days prior this ED presentation ) whom presented to [facility] s/p fall a few hours prior to hospital presentation. She notes she has not been able to sleep for the last three days as she has been having lower back pain post discectomy. She was going down the stairs of her home to acquire medications when she tripped down the last three steps with both hands outstretched. She notes injury to B/L wrists and arms, as well as injuring her left foot ( left second toenail). She notes she struck the right side of her face, she did not have LOC. Currently she has the most pain over her right temporal area. She denies dizziness, change in vision/ hearing/ speech, emesis, nor severe headache. CT imaging maxillofacial notable for Fractures of the right orbit involving the right infraorbital foramen, with multiple fractures of the right zygoma/maxilla at the zygomaxillary region, infratemporal surface of the right maxilla and right frontal zygomatic suture diastases. Associated blood was also seen in the right maxillary sinus with fracture fragments slightly protruding into the right maxillary sinus. CT of the head Small amount of subarachnoid blood in the right sylvian fissure. Patient currently stable seen by Neurosurgery as well as OMFS. Patient to be admitted to Trauma team .    S/P Fall down three steps/ Orbital Fracture/Small hemorrhage of right temporal lobe/  Right- Distal intra-articular radial styloid fracture/ Left-Comminuted intra-articular fracture through the thumb metacarpal base with fragment displacement  - Additional CT scans of chest, abdomen, and C-spine unrevealing   - OMFS and Neurosurgery consults appreciated  - Currently hemodynamically stable   - ETOH level less than 10  - Orthopedic consult   - NPO for now  - ISS for glucose coverage  - LR hydration at 75ml/hr    Senior Resident Addendum  As above, patient admitted for repeat CTH. If remains stable and is cleared by NSY, will discharge with follow-up with OMFS, dental, NSY. Case d/w Dr. Lawrence, patient, ED, NSY.

## 2020-09-26 NOTE — DISCHARGE NOTE PROVIDER - CARE PROVIDER_API CALL
Krystal Crandall)  Neurosurgery  [address], [address]  [place] [address]  Phone: [phone]  Fax: [phone]  Follow Up Time: 1 week    Abad Aguila  DENTISTRY  [address] [place] [address], [place] [address]  Phone: [phone]  Fax: [phone]  Scheduled Appointment: 09/30/2020 09:00 AM    Camila Mena  SURGERY  [address] [place] [address]  Phone: [phone]  Fax: [phone]  Follow Up Time: 2 weeks

## 2020-09-26 NOTE — DISCHARGE NOTE PROVIDER - NSDCFUADDINST_GEN_ALL_CORE_FT
You are being discharged from [facility]. Please call to schedule a follow up appointment with Dr. Aguila ([facility] on Wednesday around 9AM), Dr. Crandall (Neurosurgery in 1 week) and the Trauma clinic (Dr. Mena within 2 weeks). You have been prescribed antibiotics and your home medications, please take as directed. Please maintain a liquid diet/soft diet for the next 2 weeks and keep using cold ice pack compresses for your facial injuries. If you have any further questions about your care, please do not hesitate to contact Dr. Mena's office or return to the Emergency Department.

## 2020-09-26 NOTE — ED PROVIDER NOTE - SECONDARY DIAGNOSIS.
Closed displaced fracture of proximal phalanx of left thumb, initial encounter Closed fracture of right wrist, initial encounter Closed head injury Nail avulsion of toe Facial bone fracture

## 2020-09-26 NOTE — ED PROVIDER NOTE - NS ED ROS FT
Constitutional: no fever, chills, no recent weight loss, change in appetite or malaise  Eyes: no redness/discharge/pain/vision changes  ENT: + epistaxis. no rhinorrhea/ear pain/sore throat  Cardiac: No chest pain, SOB or edema.  Respiratory: No cough or respiratory distress  GI: No nausea, vomiting, diarrhea or abdominal pain.  : No dysuria, frequency, urgency or hematuria  MS: see HPI  Neuro: No headache or weakness. No LOC.  Skin: + nail avulsion. No skin rash.  Endocrine: + diabetes.

## 2020-09-26 NOTE — ED PROVIDER NOTE - PROGRESS NOTE DETAILS
PALMER: Per radiology - patient with multiple facial fractures as documented, however, also with small subarachnoid hemorrhage. Will consult trauma, neurosurgery and OMFS. Per Neurosurg, rpt CT head. Since pt received contrast for completion scan recently, will have to wait until after 17:00 for rpt. Pending final reads on CT chest and abd and will f/u w/ trauma.

## 2020-09-26 NOTE — DISCHARGE NOTE PROVIDER - NSDCCPCAREPLAN_GEN_ALL_CORE_FT
PRINCIPAL DISCHARGE DIAGNOSIS  Diagnosis: Subarachnoid bleed  Assessment and Plan of Treatment:       SECONDARY DISCHARGE DIAGNOSES  Diagnosis: Facial bone fracture  Assessment and Plan of Treatment:     Diagnosis: Nail avulsion of toe  Assessment and Plan of Treatment:     Diagnosis: Closed head injury  Assessment and Plan of Treatment:     Diagnosis: Closed fracture of right wrist, initial encounter  Assessment and Plan of Treatment:     Diagnosis: Closed displaced fracture of proximal phalanx of left thumb, initial encounter  Assessment and Plan of Treatment:

## 2020-09-26 NOTE — DISCHARGE NOTE PROVIDER - PROVIDER TOKENS
PROVIDER:[TOKEN:[[id number]],FOLLOWUP:[1 week]],PROVIDER:[TOKEN:[[id number]],SCHEDULEDAPPT:[09/30/2020],SCHEDULEDAPPTTIME:[09:00 AM]],PROVIDER:[TOKEN:[[id number]],FOLLOWUP:[2 weeks]]

## 2020-09-26 NOTE — ED PROVIDER NOTE - NS ED MD DISPO ADMITTING SERVICE
[address]  1/17, 7/10, 2003   • COLONOSCOPY N/A 1/6/2017    Performed by Williams Aguillon MD at [address] [place] [address] ENDOSCOPY   • EGD  12/12, 7/10   • SURG

## 2020-09-26 NOTE — DISCHARGE NOTE PROVIDER - NSDCMRMEDTOKEN_GEN_ALL_CORE_FT
amoxicillin-clavulanate 875 mg-125 mg oral tablet: 1 tab(s) orally 2 times a day   Farxiga 5 mg oral tablet: 1 tab(s) orally once a day  HumuLIN R 100 units/mL injectable solution: 10 unit(s) injectable 2 times a day  rosuvastatin 10 mg oral tablet: 1 tab(s) orally once a day  Synthroid 200 mcg (0.2 mg) oral tablet: 1 tab(s) orally once a day

## 2020-09-26 NOTE — CONSULT NOTE ADULT - SUBJECTIVE AND OBJECTIVE BOX
Patient is a 61 y/o female with PMHx of DM, HLD, Hypothyroid, and back pain ( S/P L4 Discectomy at [place] with Dr Mayfield- 3 days prior this ED presentation ) whom presented to [facility] s/p fall a few hours prior to hospital presentation. She notes she has not been able to sleep for the last three days as she has been having lower back pain post discectomy.     TRAUMA ACTIVATION LEVEL:  Alert    MECHANISM OF INJURY:   Fall    GCS: 	E: 4	V: 5	M: 6    60y old f s/p  HPI:      PAST MEDICAL & SURGICAL HISTORY:  Diabetes mellitus    No significant past surgical history        Allergies    No Known Allergies    Intolerances        Home Medications:      ROS: 10-system review is otherwise negative except HPI above.      Primary Survey:    A - airway intact  B - bilateral breath sounds and good chest rise  C - palpable pulses in all extremities  D - GCS 15 on arrival, COLLAZO  Exposure obtained    Vital Signs Last 24 Hrs  T(C): 36.4 (26 Sep 2020 04:09), Max: 36.4 (26 Sep 2020 04:09)  T(F): 97.5 (26 Sep 2020 04:09), Max: 97.5 (26 Sep 2020 04:09)  HR: 61 (26 Sep 2020 04:09) (61 - 61)  BP: 193/92 (26 Sep 2020 04:09) (193/92 - 193/92)  BP(mean): --  RR: 18 (26 Sep 2020 04:09) (18 - 18)  SpO2: 97% (26 Sep 2020 04:09) (97% - 97%)    Secondary Survey:   General: NAD  HEENT: Normocephalic, atraumatic, EOMI, PEERLA. no scalp lacerations   Neck: Soft, midline trachea. no cspine tenderness  Chest: No chest wall tenderness. or subq  emphysema   Cardiac: S1, S2, RRR  Respiratory: Bilateral breath sounds, clear and equal bilaterally  Abdomen: Soft, non-distended, non-tender, no rebound,   Groin: Normal appearing, pelvis stable   Ext: palp radial b/l UE, b/l DP palp in Lower Extrem.   Back: no TTP, no palpable runoff/stepoff/deformity  Rectal: No charleen blood, KRISHAN with good tone    FAST    Procedures:    LABS:  Labs:  CAPILLARY BLOOD GLUCOSE                      LFTs:         Coags:                        RADIOLOGY & ADDITIONAL STUDIES:      ---------------------------------------------------------------------------------------    ASSESSMENT:  60y old f s/p    PLAN:    -   -   -   -  d/w    Patient is a 61 y/o female with PMHx of DM, HLD, Hypothyroid, and back pain ( S/P L4 Discectomy at [place] with Dr Mayfield- 3 days prior this ED presentation ) whom presented to [facility] s/p fall a few hours prior to hospital presentation. She notes she has not been able to sleep for the last three days as she has been having lower back pain post discectomy. She was going down the stairs of her home to acquire medications when she tripped down the last three steps with both hands outstretched. She notes injury to B/L wrists and arms, as well as injuring her left foot ( left second toenail). She notes she struck the right side of her face, she did not have LOC. Currently she has the most pain over her right temporal area. She denies dizziness, change in vision/ hearing/ speech, emesis, nor severe headache.  present with patient whom assisted her after incident.     TRAUMA ACTIVATION LEVEL:  Alert    MECHANISM OF INJURY:   Fall    GCS: 15	E: 4	V: 5	M: 6      PAST MEDICAL & SURGICAL HISTORY:  Diabetes mellitus  HLD  Hypothyroid  Back pain s/p L4 Discectomy         Allergies  No Known Allergies      Home Medications:  Synthroid  Pravastatin         ROS: 10-system review is otherwise negative except HPI above.          Primary Survey:    A - airway intact  B - bilateral breath sounds and good chest rise  C - palpable pulses in all extremities  D - GCS 15 on arrival    Vital Signs  T(F): 97.5 (26 Sep 2020 04:09), Max: 97.5 (26 Sep 2020 04:09)  HR: 61 (26 Sep 2020 04:09) (61 - 61)  BP: 193/92 (26 Sep 2020 04:09) (193/92 - 193/92)  RR: 18 (26 Sep 2020 04:09) (18 - 18)  SpO2: 97% (26 Sep 2020 04:09) (97% - 97%)    Secondary Survey:   General: NAD  HEENT: Minimal Right periorbital bruising, slight pain on palpation over right temporal area without appreciated deformity  EOMI, PEERLA. no scalp lacerations   Neck: Soft, midline trachea. no c-spine tenderness  Chest: No chest wall tenderness. or subq  emphysema   Cardiac: S1, S2, RRR  Respiratory: Bilateral breath sounds, clear and equal bilaterally  Abdomen: Soft, non-distended, non-tender, no rebound,   Groin: Normal appearing, pelvis stable   Ext: palp radial b/l UE, b/l DP palp in Lower Extrem, Right arm in Volar splint, left arm Thumb Spica in place, bandage over left foot   Back: slight pain over recent surgical site  Rectal: Deferred          Labs:                         14.1   12.52 )-----------( 350      ( 26 Sep 2020 09:04 )             42.4     09-26    132<L>  |  97<L>  |  19  ----------------------------<  339<H>  4.5   |  23  |  0.7    Ca    8.0<L>      26 Sep 2020 09:04    TPro  7.4  /  Alb  4.4  /  TBili  0.3  /  DBili  x   /  AST  23  /  ALT  21  /  AlkPhos  63  09-26    INR 0.98      RADIOLOGY & ADDITIONAL STUDIES:  Xray Pelvis AP only 09.26.20   Findings/  impression: No acutely displaced fracture. Bilateral moderate hip mild pubic symphyseal and sacroiliac joint degenerative changes.    CT Maxillofacial No Cont 09.26.20   IMPRESSION:    1. Fractures of the right orbit as described above, involving the right infraorbital foramen, with multiple fractures of the right zygoma/maxilla at the zygomaxillary region, infratemporal surface of the right maxilla and right frontal zygomatic suture diastases. Associated blood in the right maxillary sinus with fracture fragments slightly protruding into the right maxillary sinus.    2. No retrobulbar hematoma.    3. Small focus of hemorrhage in the right medial temporal lobe. Unclear if this is within the subarachnoid space or could represent small parenchymal contusion.    4. Gas in the right infraorbital soft tissues, right temporomandibular joint and right infratemporal fossa, presumably posttraumatic.    5. Some sclerosis and deformity of the right lateral pterygoid plate. Unclear if this is acute or potentially remote trauma.      CT Head No Cont 09.26.20   IMPRESSION:  Acute, displaced fractures of the right zygomatic process and right maxilla. See separately dictated CT maxillofacial from the same day.    Hemorrhage within the right maxillary sinus.    Small amount of subarachnoid blood in the right sylvian fissure. No evidence of mass effect.      Xray Forearm, Right 09.26.20   Findings/  impression: Partially imaged distal intra-articular radial styloid fracture. No other fractures identified. Elbow degenerative change without elbow effusion or malalignment.      Xray Wrist 3 Views, Bilateral 9.26.20   impression:    Right wrist and hand: Bones are osteopenic. Distal radial intra-articular styloid fracture with minimal dorsal displacement. Ulnar variance is neutral. Radiocarpal and ulnocarpal degenerative changes are present.    Left hand and wrist: Osteopenic. Comminuted intra-articular fracture through the thumb metacarpal base with fragment displacement. Radiocarpal and intercarpal degenerative changes present.       Patient is a 59 y/o female with PMHx of DM, HLD, Hypothyroid, and back pain ( S/P L4 Discectomy at [place] with Dr Mayfield- 3 days prior this ED presentation ) whom presented to [facility] s/p fall a few hours prior to hospital presentation. She notes she has not been able to sleep for the last three days as she has been having lower back pain post discectomy. She was going down the stairs of her home to acquire medications when she tripped down the last three steps with both hands outstretched. She notes injury to B/L wrists and arms, as well as injuring her left foot ( left second toenail). She notes she struck the right side of her face, she did not have LOC. Currently she has the most pain over her right temporal area. She denies dizziness, change in vision/ hearing/ speech, emesis, nor severe headache.  present with patient whom assisted her after incident.     TRAUMA ACTIVATION LEVEL:  Alert    MECHANISM OF INJURY:   Fall    GCS: 15	E: 4	V: 5	M: 6      PAST MEDICAL & SURGICAL HISTORY:  Diabetes mellitus  HLD  Hypothyroid  Back pain s/p L4 Discectomy         Allergies  No Known Allergies      Home Medications:  Synthroid  Pravastatin         ROS: 10-system review is otherwise negative except HPI above.          Primary Survey:    A - airway intact  B - bilateral breath sounds and good chest rise  C - palpable pulses in all extremities  D - GCS 15 on arrival    Vital Signs  T(F): 97.5 (26 Sep 2020 04:09), Max: 97.5 (26 Sep 2020 04:09)  HR: 61 (26 Sep 2020 04:09) (61 - 61)  BP: 193/92 (26 Sep 2020 04:09) (193/92 - 193/92)  RR: 18 (26 Sep 2020 04:09) (18 - 18)  SpO2: 97% (26 Sep 2020 04:09) (97% - 97%)    Secondary Survey:   General: NAD  HEENT: Minimal Right periorbital bruising, slight pain on palpation over right temporal area without appreciated deformity  EOMI, PEERLA. no scalp lacerations   Neck: Soft, midline trachea. no c-spine tenderness  Chest: No chest wall tenderness. or subq  emphysema   Cardiac: S1, S2, RRR  Respiratory: Bilateral breath sounds, clear and equal bilaterally  Abdomen: Soft, non-distended, non-tender, no rebound,   Groin: Normal appearing, pelvis stable   Ext: palp radial b/l UE, b/l DP palp in Lower Extrem, Right arm in Volar splint, left arm Thumb Spica in place, bandage over left foot   Back: slight pain over recent surgical site  Rectal: Deferred        Labs:                         14.1   12.52 )-----------( 350      ( 26 Sep 2020 09:04 )             42.4     09-26    132<L>  |  97<L>  |  19  ----------------------------<  339<H>  4.5   |  23  |  0.7    Ca    8.0<L>      26 Sep 2020 09:04    TPro  7.4  /  Alb  4.4  /  TBili  0.3  /  DBili  x   /  AST  23  /  ALT  21  /  AlkPhos  63  09-26    INR 0.98      RADIOLOGY & ADDITIONAL STUDIES:  Xray Pelvis AP only 09.26.20   Findings/  impression: No acutely displaced fracture. Bilateral moderate hip mild pubic symphyseal and sacroiliac joint degenerative changes.    CT Maxillofacial No Cont 09.26.20   IMPRESSION:    1. Fractures of the right orbit as described above, involving the right infraorbital foramen, with multiple fractures of the right zygoma/maxilla at the zygomaxillary region, infratemporal surface of the right maxilla and right frontal zygomatic suture diastases. Associated blood in the right maxillary sinus with fracture fragments slightly protruding into the right maxillary sinus.    2. No retrobulbar hematoma.    3. Small focus of hemorrhage in the right medial temporal lobe. Unclear if this is within the subarachnoid space or could represent small parenchymal contusion.    4. Gas in the right infraorbital soft tissues, right temporomandibular joint and right infratemporal fossa, presumably posttraumatic.    5. Some sclerosis and deformity of the right lateral pterygoid plate. Unclear if this is acute or potentially remote trauma.      CT Head No Cont 09.26.20   IMPRESSION:  Acute, displaced fractures of the right zygomatic process and right maxilla. See separately dictated CT maxillofacial from the same day.    Hemorrhage within the right maxillary sinus.    Small amount of subarachnoid blood in the right sylvian fissure. No evidence of mass effect.      Xray Forearm, Right 09.26.20   Findings/  impression: Partially imaged distal intra-articular radial styloid fracture. No other fractures identified. Elbow degenerative change without elbow effusion or malalignment.        Xray Wrist 3 Views, Bilateral 9.26.20   impression:    Right wrist and hand: Bones are osteopenic. Distal radial intra-articular styloid fracture with minimal dorsal displacement. Ulnar variance is neutral. Radiocarpal and ulnocarpal degenerative changes are present.    Left hand and wrist: Osteopenic. Comminuted intra-articular fracture through the thumb metacarpal base with fragment displacement. Radiocarpal and intercarpal degenerative changes present.

## 2020-09-26 NOTE — ED PROVIDER NOTE - ATTENDING CONTRIBUTION TO CARE
sp mech fall, hit face on stairs. c/o b/l wrist pain, ha. not on ac. no neck pain, cp , sob, ab pain.  pt in nad. mild edema to left maxiallary region. perrl, eomi, neck sup, spine nt.  cta, chest nt, rrr, ab ssoft, nt. hips FROM. ambulating on own. tender b/l distal UE. nm pulses, motor and sensory. avulsion of left 2nd toenail.   imaging, wound care.

## 2020-09-26 NOTE — H&P ADULT - NSHPREVIEWOFSYSTEMS_GEN_ALL_CORE
Review of Systems  General:  Denies Fatigue, Denies Fever, Denies Weakness ,Denies Weight Loss   HEENT: Denies Trouble Swallowing ,Denies  Sore Throat , Denies Change in hearing/vision/speech ,Denies Dizziness    Cardio: Denies  Chest Pain , Palpitations    Respiratory: Denies worsening of SOB, Denies Cough  Abdomen: Denies abdominal pain, nausea, emesis   Neuro:  See HPI  MSK: See HPI  Psych: Patient denies depression, denies suicidal or homicidal ideations  Integ: Patient Denies rash, or new skin lesions

## 2020-09-26 NOTE — DISCHARGE NOTE PROVIDER - HOSPITAL COURSE
FROM ADMISSION H+P:   HPI:  Patient is a 61 y/o female with PMHx of DM, HLD, Hypothyroid, and back pain ( S/P L4 Discectomy at [place] with Dr Mayfield- 3 days prior this ED presentation ) whom presented to [facility] s/p fall a few hours prior to hospital presentation. She notes she has not been able to sleep for the last three days as she has been having lower back pain post discectomy. She was going down the stairs of her home to acquire medications when she tripped down the last three steps with both hands outstretched. She notes injury to B/L wrists and arms, as well as injuring her left foot ( left second toenail). She notes she struck the right side of her face, she did not have LOC. Currently she has the most pain over her right temporal area. She denies dizziness, change in vision/ hearing/ speech, emesis, nor severe headache.  (26 Sep 2020 17:00)    ---  HOSPITAL COURSE:   CT imaging maxillofacial notable for Fractures of the right orbit involving the right infraorbital foramen, with multiple fractures of the right zygoma/maxilla at the zygomaxillary region, infratemporal surface of the right maxilla and right frontal zygomatic suture diastases. Associated blood was also seen in the right maxillary sinus with fracture fragments slightly protruding into the right maxillary sinus. CT of the head Small amount of subarachnoid blood in the right sylvian fissure. Patient currently stable seen by Neurosurgery as well as OMFS. Patient to be admitted to Trauma team .    Patient was medically optimized and improved clinically throughout hospital course. Patient seen and examined on day of discharge.    Vital Signs Last 24 Hrs  T(C): 37 (26 Sep 2020 15:53), Max: 37 (26 Sep 2020 15:53)  T(F): 98.6 (26 Sep 2020 15:53), Max: 98.6 (26 Sep 2020 15:53)  HR: 66 (26 Sep 2020 15:53) (61 - 66)  BP: 146/63 (26 Sep 2020 15:53) (146/63 - 193/92)  BP(mean): --  RR: 18 (26 Sep 2020 15:53) (18 - 18)  SpO2: 99% (26 Sep 2020 15:53) (97% - 99%)    Physical Exam:  General: Well developed, well nourished, in no acute distress  HEENT: NCAT, PERRLA, EOMI bl, moist mucous membranes   Neck: Supple, nontender, no mass  Neurology: A&Ox3, nonfocal, CN II-XII grossly intact, sensation intact, no gait abnormalities   Respiratory: CTA B/L, No wheezing, rales, or rhonchi  CV: RRR, S1/S2 present, no murmurs, rubs, or gallops  Abdominal: Soft, nontender, non-distended, normoactive bowel sounds  Extremities: No C/C/E, peripheral pulses present  MSK: Normal ROM, no joint erythema or warmth, no joint swelling   Skin: warm, dry, normal color, no obvious rash or abnormal lesions    Patient is medically stable for discharge to home with outpatient follow up.  ---  CONSULTANTS:   Neurosurgery: no neurosurgical intervention, repeat CTH stable  OMFS: Augmentin 875-125mg q12 x 7 days, liquid/soft diet x2weeks, cold compress to right face, OP f/u Wednesday  ---  TIME SPENT:  I, the attending physician, was physically present for the key portions of the evaluation and management (E/M) service provided. The total amount of time spent reviewing the hospital notes, laboratory values, imaging findings, assessing/counseling the patient, discussing with consultant physicians, social work, nursing staff was -- minutes    ---  Primary care provider was made aware of plan for discharge:      [ x ] NO     [  ] YES

## 2020-09-26 NOTE — ED ADULT NURSE NOTE - OBJECTIVE STATEMENT
pt states she had back sx 8/5 and past 3 days she began having pain in left leg which she contacted surgeon for and was told to take ibuprofen for the pain - pt had 3 sleepless nights due to the pain and today she woke up to go to the bathroom and from exhaustion fell into door resulting in nose bleed - swelling to right side of face - b/l hand swelling and left 2nd toe abrasion.  Pt denies loc. Pt states took 600 mg ibuprofin in am - 400 mg afternoon and 600mg at midnight

## 2020-09-26 NOTE — ED PROVIDER NOTE - CARE PLAN
Principal Discharge DX:	Facial bone fracture  Secondary Diagnosis:	Closed displaced fracture of proximal phalanx of left thumb, initial encounter  Secondary Diagnosis:	Closed fracture of right wrist, initial encounter  Secondary Diagnosis:	Closed head injury  Secondary Diagnosis:	Nail avulsion of toe   Principal Discharge DX:	Subarachnoid bleed  Secondary Diagnosis:	Closed displaced fracture of proximal phalanx of left thumb, initial encounter  Secondary Diagnosis:	Closed fracture of right wrist, initial encounter  Secondary Diagnosis:	Closed head injury  Secondary Diagnosis:	Nail avulsion of toe  Secondary Diagnosis:	Facial bone fracture

## 2020-09-26 NOTE — H&P ADULT - NSHPPHYSICALEXAM_GEN_ALL_CORE
Vital Signs   T(F): 98.6 (26 Sep 2020 15:53), Max: 98.6 (26 Sep 2020 15:53)  HR: 66 (26 Sep 2020 15:53) (61 - 66)  BP: 146/63 (26 Sep 2020 15:53) (146/63 - 193/92)  RR: 18 (26 Sep 2020 15:53) (18 - 18)  SpO2: 99% (26 Sep 2020 15:53) (97% - 99%)  Secondary Survey:   General: NAD  HEENT: Minimal Right periorbital bruising, slight pain on palpation over right temporal area without appreciated deformity  EOMI, PEERLA. no scalp lacerations   Neck: Soft, midline trachea. no c-spine tenderness  Chest: No chest wall tenderness. or subq  emphysema   Cardiac: S1, S2, RRR  Respiratory: Bilateral breath sounds, clear and equal bilaterally  Abdomen: Soft, non-distended, non-tender, no rebound,   Groin: Normal appearing, pelvis stable   Ext: palp radial b/l UE, b/l DP palp in Lower Extrem, Right arm in Volar splint, left arm Thumb Spica in place, bandage over left foot   Back: slight pain over recent surgical site  Rectal: Deferred

## 2020-09-26 NOTE — ED PROVIDER NOTE - CLINICAL SUMMARY MEDICAL DECISION MAKING FREE TEXT BOX
Patient presented s/p mechanical fall, found to have multiple facial fractures, right distal radius and left thumb fx, as well as nail avulsion. CT head also showed (+) small subarachnoid hemorrhage. Fx splinted without difficulty in ED and avulsed nail fixed by overnight team. Consulted OMFS for facial fx who recommended outpatient follow up and PO abx. Consulted neurosurgery who recommended 6 hr CT head to assess for change in bleed and they will follow. Trauma team evaluated patient in ED and will admit to their service. Patient agreeable with plan. HD stable at time of admission.

## 2020-09-26 NOTE — ED PROCEDURE NOTE - CPROC ED POST PROC CARE GUIDE1
Instructed patient/caregiver regarding signs and symptoms of infection./Keep the cast/splint/dressing clean and dry./Verbal/written post procedure instructions were given to patient/caregiver./Elevate the injured extremity as instructed./Instructed patient/caregiver to follow-up with primary care physician.
Verbal/written post procedure instructions were given to patient/caregiver./Instructed patient/caregiver to follow-up with primary care physician./Instructed patient/caregiver regarding signs and symptoms of infection./Elevate the injured extremity as instructed./Keep the cast/splint/dressing clean and dry.
Keep the cast/splint/dressing clean and dry./Verbal/written post procedure instructions were given to patient/caregiver./Instructed patient/caregiver regarding signs and symptoms of infection./Instructed patient/caregiver to follow-up with primary care physician.

## 2020-09-26 NOTE — ED PROVIDER NOTE - PHYSICAL EXAMINATION
CONSTITUTIONAL: Well-appearing; well-nourished; in no apparent distress.   EYES: PERRL; EOM intact. no pain to EOM.  ENT: + dried blood in right nare. no active bleeding. + ttp/swelling/redness to right side of face (ollie-orbital). no intral oral injury.   NECK: Supple; non-tender; no cervical lymphadenopathy. No JVD.   CARDIOVASCULAR: Normal S1, S2; no murmurs, rubs, or gallops.   RESPIRATORY: Normal chest excursion with respiration; breath sounds clear and equal bilaterally; no wheezes, rhonchi, or rales.  GI/: Normal bowel sounds; non-distended; non-tender; no palpable organomegaly.   MS: + ttp/swelling to left hand thumb base with painful ROM. no anatomical snuffbox tenderness. + ttp over left wrist radial aspect. left hand non-tender. b/l hands n/v intact. non-tender to b/l elbow and shoulders. no midline tenderness to neck/back.   SKIN: left 3rd toe toenail complete avulsion. no active bleeding.   NEURO/PSYCH: A & O x 4; grossly unremarkable. CONSTITUTIONAL: Well-appearing; well-nourished; in no apparent distress.   EYES: PERRL; EOM intact. no pain to EOM.  ENT: + dried blood in right nare. no active bleeding. + ttp/swelling/redness to right side of face (ollie-orbital). no intral oral injury.   NECK: Supple; non-tender; no cervical lymphadenopathy. No JVD.   CARDIOVASCULAR: Normal S1, S2; no murmurs, rubs, or gallops.   RESPIRATORY: Normal chest excursion with respiration; breath sounds clear and equal bilaterally; no wheezes, rhonchi, or rales.  GI/: Normal bowel sounds; non-distended; non-tender; no palpable organomegaly.   MS: + ttp/swelling to left hand thumb base with painful ROM. no anatomical snuffbox tenderness. + ttp over left wrist radial aspect. left hand non-tender. b/l hands n/v intact. non-tender to b/l elbow and shoulders. no midline tenderness to neck/back.   SKIN: left 2 nd toe toenail complete avulsion. no active bleeding.   NEURO/PSYCH: A & O x 4; grossly unremarkable.

## 2020-09-26 NOTE — CONSULT NOTE ADULT - ATTENDING COMMENTS
No indication for neurosurgical intervention
61yo female with PMHx of DM, HLD, hypothyroidism and recent [facility] of spine at [facility] presenting as TRAUMA ALERT after fall. Primary survey intact, secondary survey significant for right periorbital bruising, pain over right temporal region, right arm splint, left arm thumb spica, left foot bandaging, recent surgical site pain of back. Labs significant for WBC 12.5. Imaging significant for fractures of right orbit, multiple fractures of right zygoma/maxilla, infratemporal surface of right maxilla and right frontal zygomatic suture diastases, small focus of hemorrhage in right medial temporal lobe, gas in right infraorbital soft tissues, right temporomandibular joint and right infratemporal fossa; intra-articular radial styloid fracture, comminuted intra-articular fracture through thumb metacarpal base with fragment displacement. Ortho consult, OMFS consult, neurosurgery consult. Planning for repeat CT head. Trauma team was present within 15 minutes of calling alert. I evaluated patient within 4 hours.

## 2020-09-26 NOTE — ED PROCEDURE NOTE - GENERAL PROCEDURE DETAILS
left 2nd toe toenail removed. cleansed with betadine. nail re-inserted back to the nail matrix and secured with sterile strip. wound coved with clean bandage.

## 2020-09-26 NOTE — ED PROVIDER NOTE - OBJECTIVE STATEMENT
61 yo female hx of DM present c/o right sided facial injury/ b/l upper arm pain and left 3rd toe toenail avulsion s/p tripped and fell over a step at home about an hour ago. denies numbness/weakness to injured extremities. movement worsen the pain. ice and advil improved her pain. denies LOC. denies neck/back pain. denies headache /dizziness/nausea/vomiting/chest pain/sob/abd pain. last tetanus < 5 years 61 yo female hx of DM present c/o right sided facial injury/ b/l upper arm pain and left 2nd toe toenail avulsion s/p tripped and fell over a step at home about an hour ago. denies numbness/weakness to injured extremities. movement worsen the pain. ice and advil improved her pain. denies LOC. denies neck/back pain. denies headache /dizziness/nausea/vomiting/chest pain/sob/abd pain. last tetanus < 5 years

## 2020-09-26 NOTE — H&P ADULT - ATTENDING COMMENTS
59yo female with PMHx of DM, HLD, hypothyroidism and recent discectomy of spine at [facility] presenting as TRAUMA ALERT after fall. Primary survey intact, secondary survey significant for right periorbital bruising, pain over right temporal region, right arm splint, left arm thumb spica, left foot bandaging, recent surgical site pain of back. Labs significant for WBC 12.5. Imaging significant for fractures of right orbit, multiple fractures of right zygoma/maxilla, infratemporal surface of right maxilla and right frontal zygomatic suture diastases, small focus of hemorrhage in right medial temporal lobe, gas in right infraorbital soft tissues, right temporomandibular joint and right infratemporal fossa; intra-articular radial styloid fracture, comminuted intra-articular fracture through thumb metacarpal base with fragment displacement. Ortho consult, OMFS consult, neurosurgery consult. Planning for repeat CT head. Admit to trauma. Trauma team was present within 15 minutes of calling alert. I evaluated patient within 4 hours.

## 2020-09-26 NOTE — DISCHARGE NOTE PROVIDER - CARE PROVIDERS DIRECT ADDRESSES
[email or web address]

## 2020-09-26 NOTE — ED ADULT TRIAGE NOTE - CHIEF COMPLAINT QUOTE
pt biba s/p fall. as per patient she got up from her sleep to go use the bathroom, tripped and feel hitting her face on the floor. pt denies any loc, denies blood thinners.

## 2020-09-30 DIAGNOSIS — S62.512A DISPLACED FRACTURE OF PROXIMAL PHALANX OF LEFT THUMB, INITIAL ENCOUNTER FOR CLOSED FRACTURE: ICD-10-CM

## 2020-09-30 DIAGNOSIS — S02.81XA FRACTURE OF OTHER SPECIFIED SKULL AND FACIAL BONES, RIGHT SIDE, INITIAL ENCOUNTER FOR CLOSED FRACTURE: ICD-10-CM
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
## 2020-09-30 DIAGNOSIS — S06.6X0A TRAUMATIC SUBARACHNOID HEMORRHAGE WITHOUT LOSS OF CONSCIOUSNESS, INITIAL ENCOUNTER: ICD-10-CM
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
## 2020-09-30 DIAGNOSIS — R40.2251 COMA SCALE, BEST VERBAL RESPONSE, ORIENTED, IN THE FIELD [EMT OR AMBULANCE]: ICD-10-CM

## 2020-09-30 DIAGNOSIS — E78.5 HYPERLIPIDEMIA, UNSPECIFIED: ICD-10-CM

## 2020-09-30 DIAGNOSIS — S02.40EA ZYGOMATIC FRACTURE, RIGHT SIDE, INITIAL ENCOUNTER FOR CLOSED FRACTURE: ICD-10-CM
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
## 2020-09-30 DIAGNOSIS — S52.511A DISPLACED FRACTURE OF RIGHT RADIAL STYLOID PROCESS, INITIAL ENCOUNTER FOR CLOSED FRACTURE: ICD-10-CM

## 2020-09-30 DIAGNOSIS — R40.2361 COMA SCALE, BEST MOTOR RESPONSE, OBEYS COMMANDS, IN THE FIELD [EMT OR AMBULANCE]: ICD-10-CM

## 2020-09-30 DIAGNOSIS — E11.9 TYPE 2 DIABETES MELLITUS WITHOUT COMPLICATIONS: ICD-10-CM

## 2020-09-30 DIAGNOSIS — R40.2141 COMA SCALE, EYES OPEN, SPONTANEOUS, IN THE FIELD [EMT OR AMBULANCE]: ICD-10-CM

## 2020-09-30 DIAGNOSIS — S02.40CA MAXILLARY FRACTURE, RIGHT SIDE, INITIAL ENCOUNTER FOR CLOSED FRACTURE: ICD-10-CM

## 2020-09-30 DIAGNOSIS — E03.9 HYPOTHYROIDISM, UNSPECIFIED: ICD-10-CM

## 2020-09-30 DIAGNOSIS — S91.205A UNSPECIFIED OPEN WOUND OF LEFT LESSER TOE(S) WITH DAMAGE TO NAIL, INITIAL ENCOUNTER: ICD-10-CM

## 2020-10-01 ENCOUNTER — APPOINTMENT (OUTPATIENT)
Dept: ORTHOPEDIC SURGERY | Facility: CLINIC | Age: 60
End: 2020-10-01
Payer: COMMERCIAL

## 2020-10-01 ENCOUNTER — LABORATORY RESULT (OUTPATIENT)
Age: 60
End: 2020-10-01

## 2020-10-01 ENCOUNTER — TRANSCRIPTION ENCOUNTER (OUTPATIENT)
Age: 60
End: 2020-10-01

## 2020-10-01 VITALS — RESPIRATION RATE: 16 BRPM | BODY MASS INDEX: 32.44 KG/M2 | HEIGHT: 64 IN | WEIGHT: 190 LBS

## 2020-10-01 DIAGNOSIS — Z00.00 ENCOUNTER FOR GENERAL ADULT MEDICAL EXAMINATION W/OUT ABNORMAL FINDINGS: ICD-10-CM

## 2020-10-01 DIAGNOSIS — Z87.891 PERSONAL HISTORY OF NICOTINE DEPENDENCE: ICD-10-CM

## 2020-10-01 DIAGNOSIS — S52.571A OTHER INTRAARTICULAR FRACTURE OF LOWER END OF RIGHT RADIUS, INITIAL ENCOUNTER FOR CLOSED FRACTURE: ICD-10-CM

## 2020-10-01 DIAGNOSIS — Z86.39 PERSONAL HISTORY OF OTHER ENDOCRINE, NUTRITIONAL AND METABOLIC DISEASE: ICD-10-CM

## 2020-10-01 DIAGNOSIS — S62.222A: ICD-10-CM

## 2020-10-01 PROCEDURE — 73140 X-RAY EXAM OF FINGER(S): CPT | Mod: FA

## 2020-10-01 PROCEDURE — 99203 OFFICE O/P NEW LOW 30 MIN: CPT

## 2020-10-01 PROCEDURE — 73110 X-RAY EXAM OF WRIST: CPT | Mod: 50

## 2020-10-01 RX ORDER — ROSUVASTATIN CALCIUM 10 MG/1
10 TABLET, FILM COATED ORAL
Refills: 0 | Status: ACTIVE | COMMUNITY

## 2020-10-01 RX ORDER — INSULIN HUMAN 100 [IU]/ML
100 INJECTION, SOLUTION PARENTERAL
Refills: 0 | Status: ACTIVE | COMMUNITY

## 2020-10-01 RX ORDER — LEVOTHYROXINE SODIUM 200 MCG
200 VIAL (EA) INTRAVENOUS
Refills: 0 | Status: ACTIVE | COMMUNITY

## 2020-10-05 ENCOUNTER — APPOINTMENT (OUTPATIENT)
Dept: ORTHOPEDIC SURGERY | Facility: AMBULATORY SURGERY CENTER | Age: 60
End: 2020-10-05

## 2020-10-15 ENCOUNTER — APPOINTMENT (OUTPATIENT)
Dept: ORTHOPEDIC SURGERY | Facility: CLINIC | Age: 60
End: 2020-10-15